# Patient Record
Sex: FEMALE | Race: WHITE | Employment: UNEMPLOYED | URBAN - METROPOLITAN AREA
[De-identification: names, ages, dates, MRNs, and addresses within clinical notes are randomized per-mention and may not be internally consistent; named-entity substitution may affect disease eponyms.]

---

## 2023-01-10 ENCOUNTER — OFFICE VISIT (OUTPATIENT)
Dept: OBGYN CLINIC | Age: 63
End: 2023-01-10
Payer: COMMERCIAL

## 2023-01-10 VITALS
DIASTOLIC BLOOD PRESSURE: 84 MMHG | WEIGHT: 137.4 LBS | SYSTOLIC BLOOD PRESSURE: 136 MMHG | HEIGHT: 62 IN | BODY MASS INDEX: 25.28 KG/M2

## 2023-01-10 DIAGNOSIS — Z01.419 ENCOUNTER FOR GYNECOLOGICAL EXAMINATION: Primary | ICD-10-CM

## 2023-01-10 DIAGNOSIS — Z12.31 ENCOUNTER FOR SCREENING MAMMOGRAM FOR BREAST CANCER: ICD-10-CM

## 2023-01-10 LAB — PAP SMEAR: NORMAL

## 2023-01-10 PROCEDURE — 99396 PREV VISIT EST AGE 40-64: CPT | Performed by: NURSE PRACTITIONER

## 2023-01-10 RX ORDER — AMOXICILLIN 500 MG/1
CAPSULE ORAL
COMMUNITY
Start: 2023-01-05

## 2023-01-10 RX ORDER — PSYLLIUM HUSK 3.4 G/7G
POWDER ORAL
COMMUNITY
Start: 2022-11-17

## 2023-01-10 RX ORDER — AMLODIPINE BESYLATE 10 MG/1
TABLET ORAL
COMMUNITY
Start: 2022-10-18

## 2023-01-10 RX ORDER — LEVOTHYROXINE SODIUM 125 MCG
TABLET ORAL
COMMUNITY
Start: 2022-10-18

## 2023-01-10 ASSESSMENT — ENCOUNTER SYMPTOMS
RESPIRATORY NEGATIVE: 1
SHORTNESS OF BREATH: 0
BACK PAIN: 0
COUGH: 0
GASTROINTESTINAL NEGATIVE: 1
ALLERGIC/IMMUNOLOGIC NEGATIVE: 1
ABDOMINAL DISTENTION: 0

## 2023-01-10 ASSESSMENT — PATIENT HEALTH QUESTIONNAIRE - PHQ9
SUM OF ALL RESPONSES TO PHQ QUESTIONS 1-9: 0
2. FEELING DOWN, DEPRESSED OR HOPELESS: 0
1. LITTLE INTEREST OR PLEASURE IN DOING THINGS: 0
SUM OF ALL RESPONSES TO PHQ9 QUESTIONS 1 & 2: 0
SUM OF ALL RESPONSES TO PHQ QUESTIONS 1-9: 0

## 2023-01-10 NOTE — PROGRESS NOTES
[address] [facility] - [id number] [place] [address] [address] [facility]  Dept: [phone]    1/10/23    Mk Daughters       Gyn Annual Exam          CHIEF COMPLAINT:    Chief Complaint   Patient presents with    Annual Exam     Last pap 11/1/21 ASCUS/ASCH HPV-  previous pt records here                   /84 (Site: Left Upper Arm, Position: Sitting, Cuff Size: Medium Adult)   Ht 5' 2\" (1.575 m)   Wt 137 lb 6.4 oz (62.3 kg)   BMI 25.13 kg/m²       HPI :   Mk Collins 1960 is a 63 y.o. Loralyn Horse is here for an annual exam. She is postmenopausal and does not take HRT. They are not sexually active due to his health. She recently stopped drinking due to cirrhosis.     _____________________________________________________________________  Past Medical History:   Diagnosis Date    Ankle fracture     Bronchitis     Elevated LFTs     H/O hyperlipidemia     Hemochromatosis     History of arthritis     History of hypertension     Hypothyroidism     Lung nodule     Migraines     Obesity     Osteoarthritis     Shoulder sprain     Sinus infection                                                                    Past Surgical History:   Procedure Laterality Date    ADENOIDECTOMY      ARM SURGERY Left     calcium & fatty tissue removed left arm    BACK SURGERY  02/21/2020    FOOT GANGLION EXCISION      HAND SURGERY Right     HIP SURGERY Right 2015    MOUTH SURGERY      TONSILLECTOMY      TUBAL LIGATION       Family History   Problem Relation Age of Onset    Hypertension Mother     Osteoarthritis Mother     Osteoarthritis Father     Ovarian Cancer Sister     Osteoarthritis Sister     Arthritis Maternal Grandmother     Macular Degen Maternal Grandmother     No Known Problems Maternal Grandfather     No Known Problems Paternal Grandmother     No Known Problems Paternal Grandfather     Breast Cancer Maternal Aunt      Social History     Tobacco Use Smoking Status Every Day    Packs/day: 0.75    Types: Cigarettes   Smokeless Tobacco Never     Social History     Substance and Sexual Activity   Alcohol Use Not Currently     Current Outpatient Medications   Medication Sig Dispense Refill    Acetaminophen (TYLENOL ARTHRITIS PAIN PO) Tylenol Arthritis Pain      amLODIPine (NORVASC) 10 MG tablet amlodipine 10 mg tablet      SYNTHROID 125 MCG tablet       RA VITAMIN B-12 TR 1000 MCG TBCR take 1 tablet by mouth once daily      amoxicillin (AMOXIL) 500 MG capsule take 1 capsule by mouth every 8 hours until finished      VITAMIN E PO Take by mouth      MELATONIN PO Take by mouth       No current facility-administered medications for this visit. Screenings:    PHQ-9 Total Score: 0 (1/10/2023  9:51 AM)     **If either question is answered in a  positive fashion then complete the PHQ9 Scoring Evaluation and make the appropriate referral**    Fall Risk: No flowsheet data found. Tobacco usage: Tobacco Use: High Risk    Smoking Tobacco Use: Every Day    Smokeless Tobacco Use: Never    Passive Exposure: Not on file       Allergies:  Nitrofurantoin, Ciprofloxacin, Lisinopril, Lovastatin, and Sulfamethoxazole-trimethoprim    Gynecologic History:  No LMP recorded. Patient is postmenopausal.  Sexually Active: No  Dyspareunia: n/a  STD History: No  Hx HRT no  Dexascan: last couple of years  Colonoscopy:   Family hx Breast, Ovarian or Colorectal Cancer: sister ovarian ca (brca negative)       OB History    Para Term  AB Living   4 4 0 0 0 4   SAB IAB Ectopic Molar Multiple Live Births   0 0 0 0 0 4     ______________________________________________________________________  REVIEW OF SYSTEMS:  Review of Systems   Constitutional: Negative. Negative for activity change, appetite change and fatigue. HENT: Negative. Respiratory: Negative. Negative for cough and shortness of breath. Cardiovascular: Negative. Gastrointestinal: Negative.   Negative for abdominal distention. Endocrine: Negative. Genitourinary: Negative. Musculoskeletal:  Negative for arthralgias and back pain. Skin: Negative. Allergic/Immunologic: Negative. Neurological:  Negative for dizziness and light-headedness. Physical Exam  Vitals reviewed. Constitutional:       Appearance: Normal appearance. HENT:      Head: Normocephalic. Cardiovascular:      Rate and Rhythm: Normal rate and regular rhythm. Pulmonary:      Effort: Pulmonary effort is normal.      Breath sounds: Normal breath sounds. Chest:   Breasts:     Right: Normal. No mass, skin change or tenderness. Left: Normal. No mass, skin change or tenderness. Abdominal:      General: Abdomen is flat. Palpations: Abdomen is soft. Genitourinary:     General: Normal vulva. Labia:         Right: No rash or lesion. Left: No rash or lesion. Urethra: No prolapse. Vagina: Normal. No vaginal discharge or lesions. Cervix: No friability or lesion. Uterus: Normal. Not enlarged and not tender. Adnexa: Right adnexa normal and left adnexa normal.        Right: No mass or tenderness. Left: No mass or tenderness. Musculoskeletal:         General: Normal range of motion. Cervical back: Neck supple. Lymphadenopathy:      Upper Body:      Right upper body: No supraclavicular or axillary adenopathy. Left upper body: No supraclavicular or axillary adenopathy. Skin:     General: Skin is warm and dry. Neurological:      Mental Status: She is alert. Psychiatric:         Mood and Affect: Mood normal.            ASSESSMENT/PLAN    63 y.o. Female; Annual   Diagnosis Orders   1. Encounter for gynecological examination  PAP SMEAR      2. Encounter for screening mammogram for breast cancer  [facility] TIMI DIGITAL SCREEN BILATERAL          1. Encounter for gynecological examination  -     PAP SMEAR; Future  2.  Encounter for screening mammogram for breast cancer  -     [facility] TIMI DIGITAL SCREEN BILATERAL; Future              Hereditary Breast, Ovarian,Colon and Uterine Cancer screening Done. Tobacco & Secondary smoke risks reviewed; instructed oncessation and avoidance      - Pap collected per ASCCP guidelines. -Menopause symptoms discussed   - Screening mammogram discussed and advised yearly if normal starting at age 36.  - Calcium and Vitamin D dosing reviewed. - Colonoscopy screening reviewed. -General diet and exercise reviewed. - Routine health maintenance per patients PCP.     Return in about 1 year (around 1/10/2024) for annual exam.    Electronically signed by COLEEN Sykes CNP on 1/10/2023at 10:23 AM

## 2023-01-16 DIAGNOSIS — Z01.419 ENCOUNTER FOR GYNECOLOGICAL EXAMINATION: ICD-10-CM

## 2023-02-15 NOTE — RESULT ENCOUNTER NOTE
"Chief Complaint   Patient presents with     RECHECK     6 month follow up       Blood pressure 126/78, pulse 100, height 1.702 m (5' 7\"), weight 128.4 kg (283 lb).    Arabella Megloza, EMT    " Her pap was negative. We will repeat it in 3 years.   Continue yearly annual exams

## 2023-06-20 ENCOUNTER — OFFICE VISIT (OUTPATIENT)
Dept: OBGYN CLINIC | Age: 63
End: 2023-06-20
Payer: COMMERCIAL

## 2023-06-20 VITALS
SYSTOLIC BLOOD PRESSURE: 158 MMHG | BODY MASS INDEX: 25.21 KG/M2 | DIASTOLIC BLOOD PRESSURE: 82 MMHG | HEIGHT: 62 IN | WEIGHT: 137 LBS

## 2023-06-20 DIAGNOSIS — N89.8 VAGINAL IRRITATION: Primary | ICD-10-CM

## 2023-06-20 PROCEDURE — 99213 OFFICE O/P EST LOW 20 MIN: CPT | Performed by: NURSE PRACTITIONER

## 2023-06-20 RX ORDER — ATORVASTATIN CALCIUM 10 MG/1
10 TABLET, FILM COATED ORAL NIGHTLY
COMMUNITY
Start: 2023-06-13

## 2023-06-20 RX ORDER — METOPROLOL SUCCINATE 25 MG/1
25 TABLET, EXTENDED RELEASE ORAL DAILY
COMMUNITY
Start: 2023-06-13

## 2023-06-20 ASSESSMENT — ENCOUNTER SYMPTOMS
GASTROINTESTINAL NEGATIVE: 1
RESPIRATORY NEGATIVE: 1

## 2023-06-20 NOTE — PROGRESS NOTES
[address] [facility] Agustin Vyas  [address] [facility] [address] [address]  Dept: [phone]  Dept Fax: [phone]         2023  Fiona Ruiz       Chief Complaint  Chief Complaint   Patient presents with    Vaginal Pain     On & off x2wks     . Blood pressure (!) 158/82, height 5' 2\" (1.575 m), weight 137 lb (62.1 kg). HPI:     Fiona Ruiz  1960 is a 63 y.o.  here today for evaluation of vaginal irritation and pain off and on x 2 weeks. She is not sexually active.        OB History    Para Term  AB Living   4 4 0 0 0 4   SAB IAB Ectopic Molar Multiple Live Births   0 0 0 0 0 4      # Outcome Date GA Lbr Romel/2nd Weight Sex Delivery Anes PTL Lv   4 Para 96    M Vag-Spont   HELENA   3 Para 05/10/91    M Vag-Spont   HELENA   2 Para 90    M Vag-Spont   HELENA   1 Para 79    F Vag-Spont   HELENA       Past Medical History:   Diagnosis Date    Ankle fracture     Bronchitis     Elevated LFTs     H/O hyperlipidemia     Hemochromatosis     History of arthritis     History of hypertension     Hypothyroidism     Lung nodule     Migraines     Obesity     Osteoarthritis     Shoulder sprain     Sinus infection        Past Surgical History:   Procedure Laterality Date    ADENOIDECTOMY      ARM SURGERY Left     calcium & fatty tissue removed left arm    BACK SURGERY  2020    FOOT GANGLION EXCISION      HAND SURGERY Right     HIP SURGERY Right 2015    MOUTH SURGERY      TONSILLECTOMY      TUBAL LIGATION         Family History   Problem Relation Age of Onset    Hypertension Mother     Osteoarthritis Mother     Osteoarthritis Father     Ovarian Cancer Sister     Osteoarthritis Sister     Arthritis Maternal Grandmother     Macular Degen Maternal Grandmother     No Known Problems Maternal Grandfather     No Known Problems Paternal Grandmother     No Known Problems Paternal Grandfather     Breast Cancer Maternal Aunt

## 2023-06-28 ENCOUNTER — TELEPHONE (OUTPATIENT)
Dept: OBGYN CLINIC | Age: 63
End: 2023-06-28

## 2023-06-30 DIAGNOSIS — N89.8 VAGINAL IRRITATION: ICD-10-CM

## 2025-03-17 ENCOUNTER — TELEPHONE (OUTPATIENT)
Dept: OBGYN CLINIC | Age: 65
End: 2025-03-17

## 2025-03-17 DIAGNOSIS — Z12.31 ENCOUNTER FOR SCREENING MAMMOGRAM FOR BREAST CANCER: Primary | ICD-10-CM

## 2025-03-17 NOTE — TELEPHONE ENCOUNTER
[facility] in [place]    Fax:     Pt calling for JLUIS order to be placed prior to visit, she goes to [facility]

## 2025-05-07 NOTE — PROGRESS NOTES
[facility] [place]  [address]  [address]  [facility] [address]  Dept: [phone]    25    Katina Vargas       Gyn Annual Exam          CHIEF COMPLAINT:    Chief Complaint   Patient presents with    Annual Exam     Last pap 1/10/23 WNL last mammogram 22 WNL                   /67 (BP Site: Right Upper Arm, Patient Position: Sitting, BP Cuff Size: Medium Adult)   Ht 1.575 m (5' 2\")   Wt 59.2 kg (130 lb 9.6 oz)   BMI 23.89 kg/m²       HPI :   Katina Vargas 1960 is a 64 y.o.  is here for an annual exam.She is postmenopausal and does not take HRT.  Her  has passed  Last pap 1/10/2023 NILM      Doesn't work.  Mom is 86    _____________________________________________________________________  Past Medical History:   Diagnosis Date    Ankle fracture     Bronchitis     Elevated LFTs     H/O hyperlipidemia     Hemochromatosis     History of arthritis     History of hypertension     Hypothyroidism     Lung nodule     Migraines     Obesity     Osteoarthritis     Shoulder sprain     Sinus infection                                                                    Past Surgical History:   Procedure Laterality Date    ADENOIDECTOMY      ARM SURGERY Left     calcium & fatty tissue removed left arm    BACK SURGERY  2020    FOOT GANGLION EXCISION      HAND SURGERY Right     HIP SURGERY Right 2015    MOUTH SURGERY      TONSILLECTOMY      TUBAL LIGATION       Family History   Problem Relation Age of Onset    Hypertension Mother     Osteoarthritis Mother     Osteoarthritis Father     Ovarian Cancer Sister     Osteoarthritis Sister     Arthritis Maternal Grandmother     Macular Degen Maternal Grandmother     No Known Problems Maternal Grandfather     No Known Problems Paternal Grandmother     No Known Problems Paternal Grandfather     Breast Cancer Maternal Aunt      Social History     Tobacco Use   Smoking Status

## 2025-05-08 ENCOUNTER — OFFICE VISIT (OUTPATIENT)
Dept: OBGYN CLINIC | Age: 65
End: 2025-05-08
Payer: COMMERCIAL

## 2025-05-08 VITALS
DIASTOLIC BLOOD PRESSURE: 67 MMHG | SYSTOLIC BLOOD PRESSURE: 136 MMHG | BODY MASS INDEX: 24.03 KG/M2 | HEIGHT: 62 IN | WEIGHT: 130.6 LBS

## 2025-05-08 DIAGNOSIS — Z01.419 ENCOUNTER FOR GYNECOLOGICAL EXAMINATION: Primary | ICD-10-CM

## 2025-05-08 DIAGNOSIS — Z78.0 POSTMENOPAUSAL: ICD-10-CM

## 2025-05-08 LAB — PAP SMEAR: NORMAL

## 2025-05-08 PROCEDURE — 99396 PREV VISIT EST AGE 40-64: CPT | Performed by: NURSE PRACTITIONER

## 2025-05-08 ASSESSMENT — ENCOUNTER SYMPTOMS
RESPIRATORY NEGATIVE: 1
SHORTNESS OF BREATH: 0
ALLERGIC/IMMUNOLOGIC NEGATIVE: 1
COUGH: 0
BACK PAIN: 0
ABDOMINAL DISTENTION: 0
GASTROINTESTINAL NEGATIVE: 1

## 2025-05-12 DIAGNOSIS — Z01.419 ENCOUNTER FOR GYNECOLOGICAL EXAMINATION: ICD-10-CM

## 2025-05-13 ENCOUNTER — RESULTS FOLLOW-UP (OUTPATIENT)
Dept: OBGYN CLINIC | Age: 65
End: 2025-05-13

## 2025-06-19 ENCOUNTER — RESULTS FOLLOW-UP (OUTPATIENT)
Dept: OBGYN CLINIC | Age: 65
End: 2025-06-19

## 2025-06-19 DIAGNOSIS — Z12.31 ENCOUNTER FOR SCREENING MAMMOGRAM FOR BREAST CANCER: ICD-10-CM

## 2025-06-19 DIAGNOSIS — Z78.0 OSTEOPENIA AFTER MENOPAUSE: Primary | ICD-10-CM

## 2025-06-19 DIAGNOSIS — Z78.0 POSTMENOPAUSAL: ICD-10-CM

## 2025-06-19 DIAGNOSIS — M85.80 OSTEOPENIA AFTER MENOPAUSE: Primary | ICD-10-CM

## 2025-06-19 PROCEDURE — 77080 DXA BONE DENSITY AXIAL: CPT | Performed by: NURSE PRACTITIONER
